# Patient Record
Sex: FEMALE | Race: OTHER | HISPANIC OR LATINO | URBAN - METROPOLITAN AREA
[De-identification: names, ages, dates, MRNs, and addresses within clinical notes are randomized per-mention and may not be internally consistent; named-entity substitution may affect disease eponyms.]

---

## 2018-08-09 ENCOUNTER — OFFICE (OUTPATIENT)
Dept: URBAN - METROPOLITAN AREA CLINIC 78 | Facility: CLINIC | Age: 73
End: 2018-08-09
Payer: COMMERCIAL

## 2018-08-09 VITALS
HEART RATE: 52 BPM | WEIGHT: 148 LBS | SYSTOLIC BLOOD PRESSURE: 146 MMHG | DIASTOLIC BLOOD PRESSURE: 82 MMHG | TEMPERATURE: 97.6 F | HEIGHT: 59 IN

## 2018-08-09 DIAGNOSIS — K57.30 DIVERTICULOSIS OF LARGE INTESTINE WITHOUT PERFORATION OR ABS: ICD-10-CM

## 2018-08-09 PROCEDURE — 99214 OFFICE O/P EST MOD 30 MIN: CPT

## 2018-08-09 RX ORDER — LACTOBACIL 2/BIFIDO 1/S.THERMO 450B CELL
PACKET (EA) ORAL
Qty: 60 | Refills: 10 | Status: COMPLETED
Start: 2018-08-09 | End: 2024-07-15

## 2018-08-09 NOTE — SERVICEHPINOTES
CAMILO CHOUDHARY   is a   73   female who complains of diverticulosis and recurrent diverticulitis flares. She mentions recent episode of diverticulitis around July 18, 2018 that manifested with loose stools, fecal urgency (no fevers/no blood in stools). She was seen by her PCP that initially advised to try Imodium then he clinically diagnosed diverticulitis, so rx abx that helped and no recurrent symptoms. She notes it took about 1 month to return to "baseline normal BMs" since recovering from the diverticulitis. She currently has BM 1-2x/day, BSS type 5 or 6. She mentions recent trip to [place] this coming week so would like to have abx just in case diverticulitis flares up again Advised to be evaluated by medical provider during next flare up including possible stool studies to r/o other etiologies. She mentions in all a total of four recurrent bouts of diverticulitis: 09/21/16 and 03/01/17 rx Cipro BID/Flagyl qd x 10 days 06/06/2017 Cephalexin 500 mg BID x 10 days and July 2018 rx cipro/flagyl. She is not on a probiotic (advised to start VSL #3). Her last colonoscopy 11/16/2015 removed benign hyperplastic polyp and recall is in 5 years.

## 2020-10-09 PROBLEM — Z86.010 PERSONAL HISTORY OF COLON POLYPS: Status: ACTIVE | Noted: 2020-10-09

## 2020-10-12 ENCOUNTER — OFFICE (OUTPATIENT)
Dept: URBAN - METROPOLITAN AREA CLINIC 79 | Facility: CLINIC | Age: 75
End: 2020-10-12

## 2020-10-12 PROCEDURE — 00038: CPT | Performed by: INTERNAL MEDICINE

## 2020-11-11 ENCOUNTER — OFFICE (OUTPATIENT)
Dept: URBAN - METROPOLITAN AREA CLINIC 34 | Facility: CLINIC | Age: 75
End: 2020-11-11
Payer: MEDICARE

## 2020-11-11 DIAGNOSIS — Z11.59 ENCOUNTER FOR SCREENING FOR OTHER VIRAL DISEASES: ICD-10-CM

## 2020-11-11 PROCEDURE — 99211 OFF/OP EST MAY X REQ PHY/QHP: CPT | Mod: 25,CS | Performed by: INTERNAL MEDICINE

## 2020-11-16 ENCOUNTER — AMBULATORY SURGICAL CENTER (OUTPATIENT)
Dept: URBAN - METROPOLITAN AREA SURGERY 23 | Facility: SURGERY | Age: 75
End: 2020-11-16
Payer: MEDICARE

## 2020-11-16 DIAGNOSIS — Z86.010 PERSONAL HISTORY OF COLONIC POLYPS: ICD-10-CM

## 2020-11-16 DIAGNOSIS — K57.30 DIVERTICULOSIS OF LARGE INTESTINE WITHOUT PERFORATION OR ABS: ICD-10-CM

## 2020-11-16 DIAGNOSIS — K56.2 VOLVULUS: ICD-10-CM

## 2020-11-16 PROCEDURE — G0105 COLORECTAL SCRN; HI RISK IND: HCPCS | Performed by: INTERNAL MEDICINE

## 2024-07-15 ENCOUNTER — TELEHEALTH PROVIDED OTHER THAN IN PATIENT'S HOME (OUTPATIENT)
Dept: URBAN - METROPOLITAN AREA TELEHEALTH 12 | Facility: TELEHEALTH | Age: 79
End: 2024-07-15
Payer: MEDICARE

## 2024-07-15 VITALS — WEIGHT: 143 LBS | HEIGHT: 59 IN

## 2024-07-15 DIAGNOSIS — K51.518 LEFT SIDED COLITIS WITH OTHER COMPLICATION: ICD-10-CM

## 2024-07-15 PROCEDURE — 99204 OFFICE O/P NEW MOD 45 MIN: CPT | Mod: 95 | Performed by: INTERNAL MEDICINE

## 2024-07-15 NOTE — SERVICEHPINOTES
PATIENT VERIFIED BY DATE OF BIRTH AND NAME. Patient has been consented for this telecommunication visit using [employer] application.   80 yo woman with history of recurrent diverticulitis who was seen in the ER for bloody diarrhea and abdominal pain. Had a CT scan which revealed inflammatory changes thought to be more consistent with ischemic colitis than diverticulitis. She notes has hypertension and monitors her blood pressure regularly at home, and did not have any extreme BPs in either direction. She notes that the pain and diarrhea has all resolved since ER visit. She was to be admitted but improved rapidly so was discharged from ER rather than admission. Currently without significant symptoms. Last colonoscopy 2020 revealing diverticulosis and tortuous left colon.

## 2024-07-23 ENCOUNTER — OFFICE (OUTPATIENT)
Dept: URBAN - METROPOLITAN AREA CLINIC 79 | Facility: CLINIC | Age: 79
End: 2024-07-23

## 2024-07-23 PROCEDURE — 00031: CPT | Performed by: INTERNAL MEDICINE
